# Patient Record
Sex: MALE | Race: WHITE | NOT HISPANIC OR LATINO | URBAN - METROPOLITAN AREA
[De-identification: names, ages, dates, MRNs, and addresses within clinical notes are randomized per-mention and may not be internally consistent; named-entity substitution may affect disease eponyms.]

---

## 2018-02-07 ENCOUNTER — EMERGENCY (EMERGENCY)
Facility: HOSPITAL | Age: 43
LOS: 1 days | Discharge: HOME | End: 2018-02-07
Admitting: EMERGENCY MEDICINE
Payer: COMMERCIAL

## 2018-02-07 VITALS
SYSTOLIC BLOOD PRESSURE: 123 MMHG | HEART RATE: 74 BPM | RESPIRATION RATE: 17 BRPM | OXYGEN SATURATION: 98 % | TEMPERATURE: 98 F | DIASTOLIC BLOOD PRESSURE: 73 MMHG

## 2018-02-07 DIAGNOSIS — F10.21 ALCOHOL DEPENDENCE, IN REMISSION: ICD-10-CM

## 2018-02-07 DIAGNOSIS — F20.9 SCHIZOPHRENIA, UNSPECIFIED: ICD-10-CM

## 2018-02-07 DIAGNOSIS — F43.23 ADJUSTMENT DISORDER WITH MIXED ANXIETY AND DEPRESSED MOOD: ICD-10-CM

## 2018-02-07 DIAGNOSIS — R69 ILLNESS, UNSPECIFIED: ICD-10-CM

## 2018-02-07 LAB
ALBUMIN SERPL ELPH-MCNC: 4.6 G/DL — SIGNIFICANT CHANGE UP (ref 3.3–5)
ALP SERPL-CCNC: 46 U/L — SIGNIFICANT CHANGE UP (ref 40–120)
ALT FLD-CCNC: 15 U/L — SIGNIFICANT CHANGE UP (ref 4–41)
AMPHET UR-MCNC: NEGATIVE — SIGNIFICANT CHANGE UP
APAP SERPL-MCNC: < 15 UG/ML — LOW (ref 15–25)
APPEARANCE UR: CLEAR — SIGNIFICANT CHANGE UP
AST SERPL-CCNC: 13 U/L — SIGNIFICANT CHANGE UP (ref 4–40)
BARBITURATES MEASUREMENT: NEGATIVE — SIGNIFICANT CHANGE UP
BARBITURATES UR SCN-MCNC: NEGATIVE — SIGNIFICANT CHANGE UP
BASOPHILS # BLD AUTO: 0.03 K/UL — SIGNIFICANT CHANGE UP (ref 0–0.2)
BASOPHILS NFR BLD AUTO: 0.5 % — SIGNIFICANT CHANGE UP (ref 0–2)
BENZODIAZ SERPL-MCNC: NEGATIVE — SIGNIFICANT CHANGE UP
BENZODIAZ UR-MCNC: NEGATIVE — SIGNIFICANT CHANGE UP
BILIRUB SERPL-MCNC: 0.3 MG/DL — SIGNIFICANT CHANGE UP (ref 0.2–1.2)
BILIRUB UR-MCNC: NEGATIVE — SIGNIFICANT CHANGE UP
BLOOD UR QL VISUAL: NEGATIVE — SIGNIFICANT CHANGE UP
BUN SERPL-MCNC: 10 MG/DL — SIGNIFICANT CHANGE UP (ref 7–23)
CALCIUM SERPL-MCNC: 9.4 MG/DL — SIGNIFICANT CHANGE UP (ref 8.4–10.5)
CANNABINOIDS UR-MCNC: NEGATIVE — SIGNIFICANT CHANGE UP
CHLORIDE SERPL-SCNC: 103 MMOL/L — SIGNIFICANT CHANGE UP (ref 98–107)
CO2 SERPL-SCNC: 28 MMOL/L — SIGNIFICANT CHANGE UP (ref 22–31)
COCAINE METAB.OTHER UR-MCNC: NEGATIVE — SIGNIFICANT CHANGE UP
COLOR SPEC: SIGNIFICANT CHANGE UP
CREAT SERPL-MCNC: 1.11 MG/DL — SIGNIFICANT CHANGE UP (ref 0.5–1.3)
EOSINOPHIL # BLD AUTO: 0.07 K/UL — SIGNIFICANT CHANGE UP (ref 0–0.5)
EOSINOPHIL NFR BLD AUTO: 1.1 % — SIGNIFICANT CHANGE UP (ref 0–6)
ETHANOL BLD-MCNC: < 10 MG/DL — SIGNIFICANT CHANGE UP
GLUCOSE SERPL-MCNC: 97 MG/DL — SIGNIFICANT CHANGE UP (ref 70–99)
GLUCOSE UR-MCNC: NEGATIVE — SIGNIFICANT CHANGE UP
HCT VFR BLD CALC: 41.2 % — SIGNIFICANT CHANGE UP (ref 39–50)
HGB BLD-MCNC: 13.9 G/DL — SIGNIFICANT CHANGE UP (ref 13–17)
IMM GRANULOCYTES # BLD AUTO: 0.01 # — SIGNIFICANT CHANGE UP
IMM GRANULOCYTES NFR BLD AUTO: 0.2 % — SIGNIFICANT CHANGE UP (ref 0–1.5)
KETONES UR-MCNC: NEGATIVE — SIGNIFICANT CHANGE UP
LEUKOCYTE ESTERASE UR-ACNC: NEGATIVE — SIGNIFICANT CHANGE UP
LYMPHOCYTES # BLD AUTO: 1.5 K/UL — SIGNIFICANT CHANGE UP (ref 1–3.3)
LYMPHOCYTES # BLD AUTO: 24.1 % — SIGNIFICANT CHANGE UP (ref 13–44)
MCHC RBC-ENTMCNC: 30.5 PG — SIGNIFICANT CHANGE UP (ref 27–34)
MCHC RBC-ENTMCNC: 33.7 % — SIGNIFICANT CHANGE UP (ref 32–36)
MCV RBC AUTO: 90.4 FL — SIGNIFICANT CHANGE UP (ref 80–100)
METHADONE UR-MCNC: NEGATIVE — SIGNIFICANT CHANGE UP
MONOCYTES # BLD AUTO: 0.48 K/UL — SIGNIFICANT CHANGE UP (ref 0–0.9)
MONOCYTES NFR BLD AUTO: 7.7 % — SIGNIFICANT CHANGE UP (ref 2–14)
NEUTROPHILS # BLD AUTO: 4.14 K/UL — SIGNIFICANT CHANGE UP (ref 1.8–7.4)
NEUTROPHILS NFR BLD AUTO: 66.4 % — SIGNIFICANT CHANGE UP (ref 43–77)
NITRITE UR-MCNC: NEGATIVE — SIGNIFICANT CHANGE UP
NRBC # FLD: 0 — SIGNIFICANT CHANGE UP
OPIATES UR-MCNC: NEGATIVE — SIGNIFICANT CHANGE UP
OXYCODONE UR-MCNC: NEGATIVE — SIGNIFICANT CHANGE UP
PCP UR-MCNC: NEGATIVE — SIGNIFICANT CHANGE UP
PH UR: 7 — SIGNIFICANT CHANGE UP (ref 4.6–8)
PLATELET # BLD AUTO: 250 K/UL — SIGNIFICANT CHANGE UP (ref 150–400)
PMV BLD: 9.1 FL — SIGNIFICANT CHANGE UP (ref 7–13)
POTASSIUM SERPL-MCNC: 4.8 MMOL/L — SIGNIFICANT CHANGE UP (ref 3.5–5.3)
POTASSIUM SERPL-SCNC: 4.8 MMOL/L — SIGNIFICANT CHANGE UP (ref 3.5–5.3)
PROT SERPL-MCNC: 7 G/DL — SIGNIFICANT CHANGE UP (ref 6–8.3)
PROT UR-MCNC: 10 MG/DL — SIGNIFICANT CHANGE UP
RBC # BLD: 4.56 M/UL — SIGNIFICANT CHANGE UP (ref 4.2–5.8)
RBC # FLD: 12 % — SIGNIFICANT CHANGE UP (ref 10.3–14.5)
RBC CASTS # UR COMP ASSIST: SIGNIFICANT CHANGE UP (ref 0–?)
SALICYLATES SERPL-MCNC: < 5 MG/DL — LOW (ref 15–30)
SODIUM SERPL-SCNC: 142 MMOL/L — SIGNIFICANT CHANGE UP (ref 135–145)
SP GR SPEC: 1.01 — SIGNIFICANT CHANGE UP (ref 1–1.04)
TSH SERPL-MCNC: 1.37 UIU/ML — SIGNIFICANT CHANGE UP (ref 0.27–4.2)
UROBILINOGEN FLD QL: NORMAL MG/DL — SIGNIFICANT CHANGE UP
WBC # BLD: 6.23 K/UL — SIGNIFICANT CHANGE UP (ref 3.8–10.5)
WBC # FLD AUTO: 6.23 K/UL — SIGNIFICANT CHANGE UP (ref 3.8–10.5)

## 2018-02-07 PROCEDURE — 90792 PSYCH DIAG EVAL W/MED SRVCS: CPT | Mod: GC

## 2018-02-07 PROCEDURE — 93010 ELECTROCARDIOGRAM REPORT: CPT | Mod: 76

## 2018-02-07 PROCEDURE — 99285 EMERGENCY DEPT VISIT HI MDM: CPT

## 2018-02-07 NOTE — ED PROVIDER NOTE - MEDICAL DECISION MAKING DETAILS
This is a 42 year old Male BIB family for psych eval r/t suicidal thoughts. Patient stats  increased anxiety and depression with suicidal thoughts for over a week. Patient states he does not have a plan but thinks about " how people can commit suicide" Medical evaluation performed. There is no clinical evidence of intoxication or any acute medical problem requiring immediate intervention. Final disposition will be determined by psychiatrist.

## 2018-02-07 NOTE — ED BEHAVIORAL HEALTH ASSESSMENT NOTE - DETAILS
no hx of suicide attempts. fleeting vague thoughts about suicide today but no plan or intent. resting tremor on perphenazine fatigue pt and family aware

## 2018-02-07 NOTE — ED PROVIDER NOTE - OBJECTIVE STATEMENT
This is a 42 year old Male BIB family for psych eval r/t suicidal thoughts. Patient stats  increased anxiety and depression with suicidal thoughts for over a week. Patient states he does not have a plan but thinks about " how people can commit suicide" Denies HI Denies AH/VH Denies ETOH/Illicit drugs Reports compliant with medications + Smoker Reports compliant with medication.

## 2018-02-07 NOTE — ED ADULT TRIAGE NOTE - CHIEF COMPLAINT QUOTE
pt brought in my father for increased anxiety, tremors x 2 months and being withdrawn. pt states " I feel very depressed" denies SI but states " I have been having a lot of negative thought" denies si/hi/ah/vh currently.  spoke with  NP Elidia, pt to go directly back

## 2018-02-07 NOTE — ED BEHAVIORAL HEALTH ASSESSMENT NOTE - SUICIDE PROTECTIVE FACTORS
Responsibility to family and others/Identifies reasons for living/Supportive social network or family/Engaged in work or school/Future oriented/Positive therapeutic relationships

## 2018-02-07 NOTE — ED BEHAVIORAL HEALTH ASSESSMENT NOTE - SUMMARY
Patient is a 43 y/o  M with charted PPhx of schizophrenia, ETOH dependence in remission, multiple hospitalizations last in 2015, no hx of suicide attempts, no violence hx, pt denies legal problems however reports having "internal charges at work", no current substance abuse, PMhx of HLD and herniated disc. Patient presents brought in by father self referred for anxious and depressed mood in the context of work stressors and transient suicidal ideation that is now resolved. He denies current SI/HI/I/P, urges for SIB, or aggressive I/I/P. He denies all manic and psychotic symptoms. He is able to engage in safety planning and is agreeable to return to the ED if he experiences recurrence of symptoms or any other safety concerns. Appt scheduled with outpatient psychiatrist Dr. Zaldivar for tomorrow at 3pm.

## 2018-02-07 NOTE — ED BEHAVIORAL HEALTH ASSESSMENT NOTE - RISK ASSESSMENT
Risk factors include Risk factors include chronic mental illness, acute stressors, and recent changes in treatment. Protective factors include no current SI/HI/I/P, future orientation, no hx of suicide attempts, positive outpatient therapeutic relationships, no access to guns, supportive father, housing and employment stability, and able to engage in safety planning. At this time pt is at low imminent risk but at elevated chronic risk. Plan to mitigate risk with continued outpatient treatment.

## 2018-02-07 NOTE — ED BEHAVIORAL HEALTH ASSESSMENT NOTE - CASE SUMMARY
Patient is a 41 y/o  M with charted PPhx of schizophrenia, ETOH dependence in remission, multiple hospitalizations last in 2015, no hx of suicide attempts, no violence hx, pt denies legal problems however reports having "internal charges at work", no current substance abuse, PMhx of HLD and herniated disc. Patient presents brought in by father self referred for anxious and depressed mood in the context of work stressors and transient suicidal ideation that is now resolved. He denies current SI/HI/I/P, urges for SIB, or aggressive I/I/P. He denies all manic and psychotic symptoms. He is able to engage in safety planning and is agreeable to return to the ED if he experiences recurrence of symptoms or any other safety concerns.

## 2018-02-07 NOTE — ED BEHAVIORAL HEALTH ASSESSMENT NOTE - HPI (INCLUDE ILLNESS QUALITY, SEVERITY, DURATION, TIMING, CONTEXT, MODIFYING FACTORS, ASSOCIATED SIGNS AND SYMPTOMS)
Patient is a 43 y/o  M, single, noncaregiver, domiciled with father, employed as a , with charted PPhx of schizophrenia, ETOH dependence in remission, multiple hospitalizations last in 2015, no hx of suicide attempts, no violence hx, pt denies legal problems however reports having "internal charges at work", no current substance abuse, PMhx of HLD and herniated disc. Patient presents brought in by father self referred for depressed mood and transient suicidal ideation.    Patient states that for the last few days he has been experiencing anxious and depressed mood, hypersomnia, and low energy level in the context of stressors at work and new onset right sided resting tremor which is bothersome. He states that he is facing numerous internal charges at work for "complaining about the uniforms" and "tripping a woman" out of frustration. He states that he is worried he may lose his job and had to speak with a company  today, who recommended he see a psychiatrist. Patient states that he came into the ED today for this reason. He reports today having fleeting thoughts of suicide "not for myself" but rather wondering "how do people commit suicide". He denies having intent or plan for himself. He denies HI/I/P, urges for SIB, or aggressive I/I/P. He denies all manic and psychotic symptoms. He denies substance use besides alcohol "1-2 glasses of wine" twice per week.     See  note for collateral from pt's father.    Attempt made to reach pt's psychiatrist Dr. Duong but he is out of the office. Spoke with covering psychiatrist Dr. Zaldivar who states that pt called him yesterday for similar complaint. Per telephone contact note: "Patient left unidentified VM on cross-covering MD's voicemail requesting callback at [phone]. On callback, patient states: "medicine is not working so well... the perphenazine and the wellbutryn". States he has been a little more depressed for a few days. Denies AH/VH/paranoia/referentiality. Denies suicidality or homicidality. States this is a mild change from his baseline. States he has an appointment with Dr. Duong on 2/13/18 and that this can wait till then, advised him to discuss concerns with his primary MD at his appointment." Dr. Zaldivar states that he can see patient tomorrow at 3pm for an emergent appt.

## 2018-02-07 NOTE — ED BEHAVIORAL HEALTH ASSESSMENT NOTE - DESCRIPTION
calm and cooperative    Vital Signs Last 24 Hrs  T(C): 36.8 (07 Feb 2018 13:40), Max: 36.8 (07 Feb 2018 13:40)  T(F): 98.2 (07 Feb 2018 13:40), Max: 98.2 (07 Feb 2018 13:40)  HR: 74 (07 Feb 2018 13:40) (74 - 74)  BP: 123/73 (07 Feb 2018 13:40) (123/73 - 123/73)  BP(mean): --  RR: 17 (07 Feb 2018 13:40) (17 - 17)  SpO2: 98% (07 Feb 2018 13:40) (98% - 98%) HLD and herniated disc. NKDA see HPI

## 2018-02-07 NOTE — ED BEHAVIORAL HEALTH NOTE - BEHAVIORAL HEALTH NOTE
Worker spoke to patient’s father Juanpablo Wallace ([phone]/ cell [phone]) for collateral information. All information is as per Mr. Wallace:    Patient is a 42 year old male, domiciled with father, with a past history of alcohol abuse, and BIB by father as a walk in. Mr. Wallace reports that patient has a long standing history of emotional problems and he has been an admitted at [facility] multiple times. Mr. Wallace reports that the patient has had a recent change in medications 3-4 months ago. Since the medication alteration the patient has been withdrawn, lacking of concentration, isolated, and has not been himself. Mr. Wallace reports that the patient's behavior has changed where the patient does not go to the gym, [employer]al or AA meetings anymore. Mr. Wallace reports that 15 months before that the patient refused to take any medication. The patient’s father states that for the 15 months he was not talking to him and did not see his sisters.  Mr. Wallace also states that for the 15 months he was fearful of the patient because he was angry and he was not like himself. Mr. Wallace reports patient was not physically aggressive. Mr. Wallace reports that the patient was calling the building department on him and called the police. Mr Wallace states that when he was non-compliant for the 15 months he called the police and stated that he was attacked by him. Mr. Wallace reports that only recently he restarted to talk to him again. Mr. Wallace states that the patient had to go to court today but his  stated he needed to go to the hospital because his speech was slow, his hands were shaking, he was withdrawn, and he was avoiding eye contact.  Mr. Wallace reports that his court date was postponed for today, according to his . Mr. Wallace states that he is not aware of his legal issues because the patient never spoke to him about it. Mr. Wallace reports that a couple days ago the patient had shown him a letter from a collection agency for $5,500 and asked him what should do. Mr. Wallace reports that patient works for the [employer] department and has said that he was frustrated at work. Mr. Wallace states he is unaware of patient’s diagnosis. Mr. Wallace states that patient is not on any drugs or alcohol but in the past he had a drinking problem. Mr. Wallace reports that there is no family history of psychiatric issues and that patient does not have SI, SIB, HI, AH, VH, or delusions. Mr. Wallace states that he is concerned because patient seems like he is not happy with himself and he has no social life or friends. In the past, the patient lived in his own apartment in [place] and he destroyed the apartment and threw objects out of the window, resulting in an admission in [facility]. Mr. Wallace reports that patient is taking Hydroxyzine 25mg twice a day, Zantax (unknown dosage), and Prehenazine 8mg 1 tablet a day. Mr. Wallace is unsure if the patient is on any additional medication.  Mr. Wallace states that patient has no medical problems and there is no guns or weapons in the home. Mr. Wallace reports that the patient sees a psychiatrist Dr. Killian ([phone]) at [facility] and a psychotherapist Kenzie River ([phone]) once a week. Mr. Wallace reports that patient does not SI but his mother seems to think so. Mr. Wallace also states he becomes paranoid at times because he bought a safe for his room and he thinks his father uses his wash cloth.

## 2018-03-13 NOTE — ED BEHAVIORAL HEALTH ASSESSMENT NOTE - NS ED [facility] OTHER STREET DRUGS MEDICATION
None known Home Suture Removal Text: Patient was provided a home suture removal kit and will remove their sutures at home.  If they have any questions or difficulties they will call the office.

## 2020-03-18 ENCOUNTER — APPOINTMENT (OUTPATIENT)
Dept: UROLOGY | Facility: CLINIC | Age: 45
End: 2020-03-18

## 2020-04-14 ENCOUNTER — APPOINTMENT (OUTPATIENT)
Dept: UROLOGY | Facility: CLINIC | Age: 45
End: 2020-04-14

## 2021-11-01 ENCOUNTER — NON-APPOINTMENT (OUTPATIENT)
Age: 46
End: 2021-11-01

## 2021-11-14 ENCOUNTER — TRANSCRIPTION ENCOUNTER (OUTPATIENT)
Age: 46
End: 2021-11-14

## 2021-11-24 ENCOUNTER — TRANSCRIPTION ENCOUNTER (OUTPATIENT)
Age: 46
End: 2021-11-24

## 2021-11-24 ENCOUNTER — APPOINTMENT (OUTPATIENT)
Dept: OTOLARYNGOLOGY | Facility: CLINIC | Age: 46
End: 2021-11-24
Payer: MEDICARE

## 2021-11-24 ENCOUNTER — NON-APPOINTMENT (OUTPATIENT)
Age: 46
End: 2021-11-24

## 2021-11-24 VITALS
BODY MASS INDEX: 27.77 KG/M2 | TEMPERATURE: 98 F | HEART RATE: 90 BPM | HEIGHT: 72 IN | DIASTOLIC BLOOD PRESSURE: 89 MMHG | WEIGHT: 205 LBS | SYSTOLIC BLOOD PRESSURE: 129 MMHG

## 2021-11-24 PROCEDURE — 31575 DIAGNOSTIC LARYNGOSCOPY: CPT

## 2021-11-24 PROCEDURE — 99204 OFFICE O/P NEW MOD 45 MIN: CPT | Mod: 25

## 2021-11-24 PROCEDURE — 69210 REMOVE IMPACTED EAR WAX UNI: CPT

## 2021-11-24 RX ORDER — FLUTICASONE PROPIONATE 50 MCG
50 SPRAY, SUSPENSION NASAL
Refills: 0 | Status: ACTIVE | COMMUNITY

## 2021-11-24 RX ORDER — FEXOFENADINE HYDROCHLORIDE AND PSEUDOEPHEDRINE HYDROCHLORIDE 180; 240 MG/1; MG/1
TABLET, FILM COATED, EXTENDED RELEASE ORAL
Refills: 0 | Status: ACTIVE | COMMUNITY

## 2021-11-24 RX ORDER — ARIPIPRAZOLE 5 MG/1
5 TABLET ORAL
Refills: 0 | Status: ACTIVE | COMMUNITY

## 2021-11-24 RX ORDER — ATORVASTATIN CALCIUM 80 MG/1
TABLET, FILM COATED ORAL
Refills: 0 | Status: ACTIVE | COMMUNITY

## 2021-11-24 NOTE — ASSESSMENT
[FreeTextEntry1] : 44 y/o M who presents with b/l otalgia, on exam b/l CI which was cleared, otherwise normal exam. \par left ear cleaned, unable to tolerate on the left \par avoid q tips \par ear hygiene\par discussed preventive measures and signs of accumulation\par R ear still with some hard wax, advised to use few drops of mineral oil, can f/u in 1-2 weeks to remove R ear cerumen\par \par \par Pt also with throat discomfort, on exam mild acid reflux seen \par will proceed to start lifestyle regiment to reduce overproduction of acid and reduce laryngeal reflux including avoiding caffein, alcohol, eating before bed, spicy and fatty foods, and head elevation at night etc. Handout detailing regiment also given\par started on PPI x 2 weeks \par \par f/up 2 weeks

## 2021-11-24 NOTE — HISTORY OF PRESENT ILLNESS
[de-identified] : 44 y/o M c/o intermittent sharp ear pain b/l, with some sensation on top his head. Did tele doc and was given amox with relief, no longer with any pain. \par no changes in hearing\par no Vertigo, tinnitus, pain, drainage or facial weakness.\par + dysphonia/hoarseness since then\par Denies dysphagia, dyspnea \par had recent EGD reportedly with no abnormalities \par \par Also with intermittent throat discomfort, contacted PMD who stated can use Allegra D, and Flonase. Pt states with mild occ nasal congestion b/l\par +dysphonia \par quit smoking 11 years ago, smoked for 17 years. \par pt denies dysphagia, nasal congestion

## 2021-11-24 NOTE — END OF VISIT
[FreeTextEntry3] : I personally saw and examined KENN JAI in detail.  I spoke to RAHUL Brooks regarding the assessment and plan of care. I performed the procedures and relevant physical exam.  I have reviewed the above assessment and plan of care and I agree.  I have made changes to the body of the note wherever necessary and appropriate.

## 2021-11-24 NOTE — PROCEDURE
[FreeTextEntry3] : After informed verbal consent is obtained, cerumen is removed from the left ear canal with a curette & suction. Pt tolerated well, no residual ear canal irritation. \par  unable to tolerate on the right even with peroxide soak. \par  [de-identified] : Procedure performed: laryngeal Endoscopy- Diagnostic\par Pre-op/post op indication: dysphonia\par Verbal and/or written consent obtained from patient, Patient was unable to cooperate with mirror\par Scope #: 3, flexible fiber optic telescope used \par Scope was introduced through the nose passed on the floor of the nose to the nasopharynx and then followed down the soft palate to the lower pharynx. The tongue Base, Larynx, Hypopharynx were examined. Base of tongue was symmetric, vallecular was clear, epiglottis was not deformed, subglottis/ pyriform and posterior pharyngeal walls were clear. +  mild acid reflux , +erythema, edema, pooling of secretions, masses or lesions. Airway patent, no foreign body visualized. No glottic/supraglottic edema. True vocal cords, arytenoids, vestibular folds, ventricles, pyriform sinuses, and aryepiglottic folds appear normal bilaterally. Vocal cords mobile with good contact b/l.\par \par

## 2021-11-24 NOTE — PHYSICAL EXAM
[Midline] : trachea located in midline position [de-identified] : b/l CI  [Normal] : no abnormal secretions [Laryngoscopy Performed] : laryngoscopy was performed, see procedure section for findings

## 2021-11-29 ENCOUNTER — NON-APPOINTMENT (OUTPATIENT)
Age: 46
End: 2021-11-29

## 2021-12-07 ENCOUNTER — APPOINTMENT (OUTPATIENT)
Dept: OTOLARYNGOLOGY | Facility: CLINIC | Age: 46
End: 2021-12-07
Payer: MEDICARE

## 2021-12-07 VITALS
TEMPERATURE: 97.2 F | SYSTOLIC BLOOD PRESSURE: 139 MMHG | WEIGHT: 205 LBS | DIASTOLIC BLOOD PRESSURE: 85 MMHG | HEART RATE: 74 BPM | BODY MASS INDEX: 27.77 KG/M2 | HEIGHT: 72 IN

## 2021-12-07 PROCEDURE — 69210 REMOVE IMPACTED EAR WAX UNI: CPT

## 2021-12-07 PROCEDURE — 99213 OFFICE O/P EST LOW 20 MIN: CPT | Mod: 25

## 2021-12-07 NOTE — END OF VISIT
[FreeTextEntry3] : I personally saw and examined KENN JAI in detail.  I spoke to VENICE Garcia regarding the assessment and plan of care. I performed the procedures and relevant physical exam.  I have reviewed the above assessment and plan of care and I agree.  I have made changes to the body of the note wherever necessary and appropriate.

## 2021-12-07 NOTE — HISTORY OF PRESENT ILLNESS
[de-identified] : 44 y/o M c/o intermittent sharp ear pain b/l, with some sensation on top his head. Did tele doc and was given amox with relief, no longer with any pain. \par no changes in hearing\par no Vertigo, tinnitus, pain, drainage or facial weakness.\par + dysphonia/hoarseness since then\par Denies dysphagia, dyspnea \par had recent EGD (April)  reportedly with no abnormalities \par \par Also with intermittent throat discomfort, contacted PMD who stated can use Allegra D, and Flonase. Pt states with mild occ nasal congestion b/l\par +dysphonia \par quit smoking 11 years ago, smoked for 17 years. \par pt denies dysphagia, nasal congestion \par at last visit, was given nexium, taking for the last 2 weeks.  Also using mineral oil for ear.  [FreeTextEntry1] : Patient presents for ear cleaning he has been using Mineral oil daily. States reflux is controlled with esomeprazole, he has been doing well for 2 days. Complains that he makes a lot of saliva and he has some swelling without pain on the left side of his neck.

## 2021-12-07 NOTE — PHYSICAL EXAM
[Normal] : mucosa is normal [Midline] : trachea located in midline position [Laryngoscopy Performed] : laryngoscopy was performed, see procedure section for findings [de-identified] : b/l CI

## 2021-12-07 NOTE — ASSESSMENT
[FreeTextEntry1] : Patient with hx of reflux presets for ear cleaning, last visit he had bilateral cerumen impaction. He has been using mineral oil daily. He is taking esomeprazole daily has been feeling well for the past 2 days. He also notes that he makes a lot of saliva. Today he complains of left sided neck swelling. On examination both ears were impacted with cerumen. Also with has mild left sided salivary gland swelling.\par -Wax cleaned from both ears with suction \par -Continue Esomeprazole x 1 month \par \par f/u 2 month or prn \par

## 2021-12-07 NOTE — PROCEDURE
[FreeTextEntry3] : After informed verbal consent is obtained, cerumen is removed from the b/l ear canal with a  suction. Pt tolerated well, no residual ear canal irritation. \par  \par  [de-identified] : Procedure performed: laryngeal Endoscopy- Diagnostic\par Pre-op/post op indication: dysphonia\par Verbal and/or written consent obtained from patient, Patient was unable to cooperate with mirror\par Scope #: 3, flexible fiber optic telescope used \par Scope was introduced through the nose passed on the floor of the nose to the nasopharynx and then followed down the soft palate to the lower pharynx. The tongue Base, Larynx, Hypopharynx were examined. Base of tongue was symmetric, vallecular was clear, epiglottis was not deformed, subglottis/ pyriform and posterior pharyngeal walls were clear. +  mild acid reflux , +erythema, edema, pooling of secretions, masses or lesions. Airway patent, no foreign body visualized. No glottic/supraglottic edema. True vocal cords, arytenoids, vestibular folds, ventricles, pyriform sinuses, and aryepiglottic folds appear normal bilaterally. Vocal cords mobile with good contact b/l.\par \par

## 2021-12-27 ENCOUNTER — NON-APPOINTMENT (OUTPATIENT)
Age: 46
End: 2021-12-27

## 2022-02-01 ENCOUNTER — APPOINTMENT (OUTPATIENT)
Dept: OTOLARYNGOLOGY | Facility: CLINIC | Age: 47
End: 2022-02-01
Payer: MEDICARE

## 2022-02-01 VITALS
TEMPERATURE: 97.8 F | SYSTOLIC BLOOD PRESSURE: 110 MMHG | HEIGHT: 72 IN | HEART RATE: 70 BPM | DIASTOLIC BLOOD PRESSURE: 72 MMHG | WEIGHT: 210 LBS | BODY MASS INDEX: 28.44 KG/M2

## 2022-02-01 PROCEDURE — 99213 OFFICE O/P EST LOW 20 MIN: CPT | Mod: 25

## 2022-02-01 PROCEDURE — 69210 REMOVE IMPACTED EAR WAX UNI: CPT

## 2022-02-01 NOTE — HISTORY OF PRESENT ILLNESS
[de-identified] : Patient with hx reflux and cerumen impaction presents for follow up. He finished taking omeprazole, reflux has resolved. He continues to have left salivary gland swelling. He also has increased saliva. \par He did switch his medications recently to latuda.

## 2022-02-01 NOTE — PHYSICAL EXAM
[de-identified] : left ear wax  [Normal] : mucosa is normal [Midline] : trachea located in midline position

## 2022-02-01 NOTE — ASSESSMENT
[FreeTextEntry1] :  presents today complaining of left salivary gland swelling. States there is no improvement since last visit. He also notes that he changed his medication to latuda and has increase saliva since. On exam he has left cerumen impaction. Salivary gland is wnl, looks slightly sluggish might be due to dehydration, no masses palpated. \par \par Plan:\par -Left ear cerumen cleaned, use debrox weekly to keep ears clean, routine debridement advised \par -reflux resolved, continue with reflux precautions \par -no signs of salivary obstruction, increase hydration \par -f/u 3 months or prn \par

## 2022-05-03 ENCOUNTER — APPOINTMENT (OUTPATIENT)
Dept: OTOLARYNGOLOGY | Facility: CLINIC | Age: 47
End: 2022-05-03
Payer: MEDICARE

## 2022-05-03 VITALS
DIASTOLIC BLOOD PRESSURE: 78 MMHG | HEART RATE: 76 BPM | SYSTOLIC BLOOD PRESSURE: 130 MMHG | TEMPERATURE: 98 F | WEIGHT: 210 LBS | BODY MASS INDEX: 28.44 KG/M2 | HEIGHT: 72 IN

## 2022-05-03 DIAGNOSIS — H92.03 OTALGIA, BILATERAL: ICD-10-CM

## 2022-05-03 PROCEDURE — 99213 OFFICE O/P EST LOW 20 MIN: CPT | Mod: 25

## 2022-05-03 PROCEDURE — 69210 REMOVE IMPACTED EAR WAX UNI: CPT

## 2022-05-03 NOTE — HISTORY OF PRESENT ILLNESS
[de-identified] : Patient with hx reflux and cerumen impaction presents for follow up. He finished taking omeprazole, reflux has resolved. He continues to have left salivary gland swelling. He also has increased saliva. \par He did switch his medications recently to latuda.  [FreeTextEntry1] : pt notices his left salivary gland is slightly swollen but less than last time, pt had right sided otalgia a month ago which resolved on its own. pt uses debrox weekly. no changes in hearing, not taking any allergy meds at this point

## 2022-05-03 NOTE — ASSESSMENT
[FreeTextEntry1] :  presents today complaining of left salivary gland swelling slightly better than last visit, has increased his water intake to a gallon a day. On exam found to have small amount of cerumen b/l which was removed  and  good salivary flow today.\par \par Plan:\par -continue to use debrox weekly to keep ears clean, routine debridement advised \par -reflux resolved, continue with reflux precautions \par -no signs of salivary obstruction today,  continue with increased hydration \par -f/u 3 months or prn \par

## 2022-05-03 NOTE — END OF VISIT
[FreeTextEntry3] : I personally saw and examined KENN ARONALBERTO in detail.  I spoke to CHAGO Gonzalez regarding the assessment and plan of care. I performed the procedures and relevant physical exam.  I have reviewed the above assessment and plan of care and I agree.  I have made changes to the body of the note wherever necessary and appropriate.\par

## 2022-05-03 NOTE — PROCEDURE
[FreeTextEntry2] : cerumen impaction\par  [FreeTextEntry3] : After informed verbal consent is obtained, cerumen is removed from the b/l ear canal with a suction. Pt tolerated well, no residual ear canal irritation. \par \par

## 2022-07-26 ENCOUNTER — APPOINTMENT (OUTPATIENT)
Dept: OTOLARYNGOLOGY | Facility: CLINIC | Age: 47
End: 2022-07-26

## 2023-06-14 ENCOUNTER — EMERGENCY (EMERGENCY)
Facility: HOSPITAL | Age: 48
LOS: 1 days | Discharge: HOME | End: 2023-06-14
Attending: EMERGENCY MEDICINE
Payer: MEDICARE

## 2023-06-14 VITALS
OXYGEN SATURATION: 94 % | TEMPERATURE: 99 F | HEIGHT: 72 IN | SYSTOLIC BLOOD PRESSURE: 145 MMHG | RESPIRATION RATE: 16 BRPM | WEIGHT: 210.1 LBS | HEART RATE: 101 BPM | DIASTOLIC BLOOD PRESSURE: 88 MMHG

## 2023-06-14 VITALS
RESPIRATION RATE: 16 BRPM | SYSTOLIC BLOOD PRESSURE: 125 MMHG | HEART RATE: 80 BPM | TEMPERATURE: 98 F | DIASTOLIC BLOOD PRESSURE: 81 MMHG | OXYGEN SATURATION: 96 %

## 2023-06-14 LAB
ALBUMIN SERPL ELPH-MCNC: 4.6 G/DL — SIGNIFICANT CHANGE UP (ref 3.3–5)
ALP SERPL-CCNC: 61 U/L — SIGNIFICANT CHANGE UP (ref 40–120)
ALT FLD-CCNC: 12 U/L — SIGNIFICANT CHANGE UP (ref 10–45)
ANION GAP SERPL CALC-SCNC: 11 MMOL/L — SIGNIFICANT CHANGE UP (ref 5–17)
AST SERPL-CCNC: 16 U/L — SIGNIFICANT CHANGE UP (ref 10–40)
BASOPHILS # BLD AUTO: 0.04 K/UL — SIGNIFICANT CHANGE UP (ref 0–0.2)
BASOPHILS NFR BLD AUTO: 0.5 % — SIGNIFICANT CHANGE UP (ref 0–2)
BILIRUB SERPL-MCNC: 0.4 MG/DL — SIGNIFICANT CHANGE UP (ref 0.2–1.2)
BUN SERPL-MCNC: 8 MG/DL — SIGNIFICANT CHANGE UP (ref 7–23)
CALCIUM SERPL-MCNC: 9.4 MG/DL — SIGNIFICANT CHANGE UP (ref 8.4–10.5)
CHLORIDE SERPL-SCNC: 102 MMOL/L — SIGNIFICANT CHANGE UP (ref 96–108)
CO2 SERPL-SCNC: 24 MMOL/L — SIGNIFICANT CHANGE UP (ref 22–31)
CREAT SERPL-MCNC: 1.08 MG/DL — SIGNIFICANT CHANGE UP (ref 0.5–1.3)
EGFR: 85 ML/MIN/1.73M2 — SIGNIFICANT CHANGE UP
EOSINOPHIL # BLD AUTO: 0.1 K/UL — SIGNIFICANT CHANGE UP (ref 0–0.5)
EOSINOPHIL NFR BLD AUTO: 1.2 % — SIGNIFICANT CHANGE UP (ref 0–6)
GLUCOSE SERPL-MCNC: 112 MG/DL — HIGH (ref 70–99)
HCT VFR BLD CALC: 45.3 % — SIGNIFICANT CHANGE UP (ref 39–50)
HGB BLD-MCNC: 15 G/DL — SIGNIFICANT CHANGE UP (ref 13–17)
IMM GRANULOCYTES NFR BLD AUTO: 0.2 % — SIGNIFICANT CHANGE UP (ref 0–0.9)
LYMPHOCYTES # BLD AUTO: 1.23 K/UL — SIGNIFICANT CHANGE UP (ref 1–3.3)
LYMPHOCYTES # BLD AUTO: 14.9 % — SIGNIFICANT CHANGE UP (ref 13–44)
MCHC RBC-ENTMCNC: 30.3 PG — SIGNIFICANT CHANGE UP (ref 27–34)
MCHC RBC-ENTMCNC: 33.1 GM/DL — SIGNIFICANT CHANGE UP (ref 32–36)
MCV RBC AUTO: 91.5 FL — SIGNIFICANT CHANGE UP (ref 80–100)
MONOCYTES # BLD AUTO: 0.54 K/UL — SIGNIFICANT CHANGE UP (ref 0–0.9)
MONOCYTES NFR BLD AUTO: 6.5 % — SIGNIFICANT CHANGE UP (ref 2–14)
NEUTROPHILS # BLD AUTO: 6.32 K/UL — SIGNIFICANT CHANGE UP (ref 1.8–7.4)
NEUTROPHILS NFR BLD AUTO: 76.7 % — SIGNIFICANT CHANGE UP (ref 43–77)
NRBC # BLD: 0 /100 WBCS — SIGNIFICANT CHANGE UP (ref 0–0)
PLATELET # BLD AUTO: 283 K/UL — SIGNIFICANT CHANGE UP (ref 150–400)
POTASSIUM SERPL-MCNC: 4 MMOL/L — SIGNIFICANT CHANGE UP (ref 3.5–5.3)
POTASSIUM SERPL-SCNC: 4 MMOL/L — SIGNIFICANT CHANGE UP (ref 3.5–5.3)
PROT SERPL-MCNC: 7.3 G/DL — SIGNIFICANT CHANGE UP (ref 6–8.3)
RBC # BLD: 4.95 M/UL — SIGNIFICANT CHANGE UP (ref 4.2–5.8)
RBC # FLD: 12 % — SIGNIFICANT CHANGE UP (ref 10.3–14.5)
SODIUM SERPL-SCNC: 137 MMOL/L — SIGNIFICANT CHANGE UP (ref 135–145)
WBC # BLD: 8.25 K/UL — SIGNIFICANT CHANGE UP (ref 3.8–10.5)
WBC # FLD AUTO: 8.25 K/UL — SIGNIFICANT CHANGE UP (ref 3.8–10.5)

## 2023-06-14 PROCEDURE — 99283 EMERGENCY DEPT VISIT LOW MDM: CPT | Mod: FS

## 2023-06-14 PROCEDURE — 80053 COMPREHEN METABOLIC PANEL: CPT

## 2023-06-14 PROCEDURE — 99283 EMERGENCY DEPT VISIT LOW MDM: CPT

## 2023-06-14 PROCEDURE — 85025 COMPLETE CBC W/AUTO DIFF WBC: CPT

## 2023-06-14 RX ORDER — ACETAMINOPHEN 500 MG
975 TABLET ORAL ONCE
Refills: 0 | Status: COMPLETED | OUTPATIENT
Start: 2023-06-14 | End: 2023-06-14

## 2023-06-14 RX ADMIN — Medication 975 MILLIGRAM(S): at 21:45

## 2023-06-14 NOTE — ED PROVIDER NOTE - NSFOLLOWUPINSTRUCTIONS_ED_ALL_ED_FT
Continue your current medication regimen.    Take Ibuprofen (i.e. Motrin, Advil) 600mg every 8 hrs for pain as needed. Take with food.   May alternate with Acetaminophen (Tylenol) 650mg every 6 hours for pain as needed.     Follow up with your Endocrinologist and Primary Care Provider upon discharge.     You may follow up with [facility]: call [phone] to make appointment.   Bring a copy of your test results (attached along with your discharge paperwork) with you to your appointment for further discussion, evaluation, and comparison with your prior results.    Please return to the Emergency Department immediately for any new, worsening, or concerning symptoms.

## 2023-06-14 NOTE — ED PROVIDER NOTE - PATIENT PORTAL LINK FT
You can access the FollowMyHealth Patient Portal offered by [facility] by registering at the following website: [email or web address]. By joining [facility]’s FollowMyHealth portal, you will also be able to view your health information using other applications (apps) compatible with our system.

## 2023-06-14 NOTE — ED PROVIDER NOTE - MUSCULOSKELETAL, MLM
Spine appears normal, range of motion is not limited, no muscle or joint tenderness. + b/l feet slightly cool, No ankle or pedal edema, No bony ttp of ankles or feet and Full ROM of ankle and toes intact, b/l PT pulses +, sensation intact

## 2023-06-14 NOTE — ED PROVIDER NOTE - PROGRESS NOTE DETAILS
Attending MD Jacques: Labs nonactionable, stable for discharge.  Follow up instructions given, importance of follow up emphasized, return to ED parameters reviewed and patient verbalized understanding.  All questions answered, all concerns addressed.

## 2023-06-14 NOTE — ED PROVIDER NOTE - CONSTITUTIONAL NEGATIVE STATEMENT, MLM
Pts wife called back, she stated that Henny Gomez was scheduled for Heart surgery today and they canceled it because He isn't feeling well. Loss of appetite, Running a fever, chills, nauseous. She stated that he had stop taking his  metFORMIN (GLUCOPHAGE) 1000 MG tablet    glipiZIDE (GLUCOTROL) 10 MG tablet    Because of surgery that was scheduled for today. But he will start taking it again.     Henny Gomez did go to a [place] testing site 0/9/01/20   Please call [phone] Prabhakar Parker no fever and no chills.

## 2023-06-14 NOTE — ED PROVIDER NOTE - OBJECTIVE STATEMENT
48yo M with PMH of HLD, depression, recently in process of w/u for Cushing's presenting with feeling that all of his toes are longer than usual, and his b/l lateral ankle bones are also larger than they previously were. Symptoms have been going on for several months. Reports he sometimes has b/l foot/ankle pain when he stands for a long time. Came to ED because he is concerned this is related to his possible Cushing's disease. Reports he went to his PCP 2 days ago for same complaint, was told everything is ok. Reports he still has another upcoming test with his endocrinologist. Reports negative pituitary MRI and normal adrenals on recent CT. Denies fever/chills, numbness/tingling, any injury to ankles or feet, any other joint discomfort.     PCP Dr. ALISON Renteria

## 2023-06-14 NOTE — ED PROVIDER NOTE - ATTENDING APP SHARED VISIT CONTRIBUTION OF CARE
Attending MD Jacques:   I personally have seen and examined this patient.  Physician assistant note reviewed and agree on plan of care and except where noted.  See below for details.     Seen in [facility]  PMD Dr. Lanza    47M with PMH/PSH including  HLD, depression, being worked up for Cushing's presents to the ED with "my toes are longer" and "my ankles are bigger".  Reports that her has noticed these changes over several months and notes that because of this he has pain when he stands for too long.  Reports went to see his PMD two days ago for similar complaint and was told that everything was ok.  Reports has been following with an endocrinologist and has an upcoming follow up.  Reports sometimes cramping in LEs.  Denies chest pain, shortness of breath, abdominal pain, nausea, vomiting, diarrhea, urinary complaints, fevers, chills, joint pain, trauma, fall.    Exam:   General: NAD  HENT: head NCAT, airway patent  Eyes: anicteric, no conjunctival injection   Lungs: lungs CTAB with good inspiratory effort, no wheezing, no rhonchi, no rales  Cardiac: +S1S2, no obvious m/r/g  GI: abdomen soft with +BS, NT, ND  MSK: ranging neck and extremities freely, bilateral LEs nontender from hip to toes, +2 DPs, FROM at hip, knees, ankles, feet  Neuro: moving all extremities spontaneously, nonfocal  Psych: normal mood and affect     A/P: 47M with reports that toes and ankles are bigger, clinical exam unremarkable, will obtain basic labs given reported cramping, if nonactionable, discussed will need outpatient follow up, verbalized understanding

## 2023-06-14 NOTE — ED ADULT NURSE NOTE - NSFALLUNIVINTERV_ED_ALL_ED
Bed/Stretcher in lowest position, wheels locked, appropriate side rails in place/Call bell, personal items and telephone in reach/Instruct patient to call for assistance before getting out of bed/chair/stretcher/Non-slip footwear applied when patient is off stretcher/[place] to call system/Physically safe environment - no spills, clutter or unnecessary equipment/Purposeful proactive rounding/Room/bathroom lighting operational, light cord in reach

## 2023-06-14 NOTE — ED ADULT NURSE NOTE - OBJECTIVE STATEMENT
48yo M w/ PMH HLD, depression, possibly Cushing's presents to ED c/o b/l ankle/foot pain. Pt states "I feel like my hoes re longer than usual and b/l ankle bones are also larger". Pt states that this has been ongoing for several months and he also has the discomfort when he stands for too long. Pt notes that he thinks this may be related to his possible Cushing's disease, he saw his PCP 2 days ago for the same complaint and was told everything was ok. Denies chest pain, SOB, N/V/D, weakness, dizziness, lightheadedness, numbness/tingling to extremities, trauma/injury, fevers/chills, sick contacts. A&Ox3. Strong peripheral pulses. Neurologically intact and follows commands. Pulse motor and sensation present and equal to all 4 extremities. Skin warm dry intact and normal for ethnicity. Ambulatory with steady gait in ED. Stretcher locked and in lowest position, appropriate side rails up. Pt instructed to notify RN if assistance is needed.

## 2023-07-24 ENCOUNTER — NON-APPOINTMENT (OUTPATIENT)
Age: 48
End: 2023-07-24

## 2023-07-24 ENCOUNTER — APPOINTMENT (OUTPATIENT)
Dept: PULMONOLOGY | Facility: CLINIC | Age: 48
End: 2023-07-24
Payer: MEDICARE

## 2023-07-24 VITALS
WEIGHT: 215 LBS | OXYGEN SATURATION: 98 % | DIASTOLIC BLOOD PRESSURE: 75 MMHG | HEART RATE: 75 BPM | HEIGHT: 72 IN | BODY MASS INDEX: 29.12 KG/M2 | SYSTOLIC BLOOD PRESSURE: 120 MMHG

## 2023-07-24 DIAGNOSIS — Z87.891 PERSONAL HISTORY OF NICOTINE DEPENDENCE: ICD-10-CM

## 2023-07-24 DIAGNOSIS — Z78.9 OTHER SPECIFIED HEALTH STATUS: ICD-10-CM

## 2023-07-24 DIAGNOSIS — Z82.5 FAMILY HISTORY OF ASTHMA AND OTHER CHRONIC LOWER RESPIRATORY DISEASES: ICD-10-CM

## 2023-07-24 DIAGNOSIS — Z80.1 FAMILY HISTORY OF MALIGNANT NEOPLASM OF TRACHEA, BRONCHUS AND LUNG: ICD-10-CM

## 2023-07-24 PROCEDURE — 94727 GAS DIL/WSHOT DETER LNG VOL: CPT

## 2023-07-24 PROCEDURE — 94060 EVALUATION OF WHEEZING: CPT

## 2023-07-24 PROCEDURE — 94729 DIFFUSING CAPACITY: CPT

## 2023-07-24 PROCEDURE — 99205 OFFICE O/P NEW HI 60 MIN: CPT | Mod: 25

## 2023-07-24 PROCEDURE — ZZZZZ: CPT

## 2023-07-24 PROCEDURE — 95012 NITRIC OXIDE EXP GAS DETER: CPT

## 2023-07-24 RX ORDER — ARIPIPRAZOLE 5 MG/1
5 TABLET ORAL
Qty: 30 | Refills: 0 | Status: DISCONTINUED | COMMUNITY
Start: 2022-12-07

## 2023-07-24 RX ORDER — BUPROPION HYDROCHLORIDE 300 MG/1
300 TABLET, EXTENDED RELEASE ORAL
Qty: 30 | Refills: 0 | Status: DISCONTINUED | COMMUNITY
Start: 2023-05-23

## 2023-07-24 RX ORDER — METRONIDAZOLE 7.5 MG/G
0.75 CREAM TOPICAL
Qty: 45 | Refills: 0 | Status: DISCONTINUED | COMMUNITY
Start: 2022-10-01

## 2023-07-24 RX ORDER — BUPROPION HYDROCHLORIDE 150 MG/1
150 TABLET, EXTENDED RELEASE ORAL
Qty: 30 | Refills: 0 | Status: DISCONTINUED | COMMUNITY
Start: 2023-04-04

## 2023-07-24 RX ORDER — ESOMEPRAZOLE MAGNESIUM 40 MG/1
40 CAPSULE, DELAYED RELEASE ORAL
Qty: 90 | Refills: 0 | Status: DISCONTINUED | COMMUNITY
Start: 2021-11-24 | End: 2023-07-24

## 2023-07-24 NOTE — DISCUSSION/SUMMARY
[FreeTextEntry1] : Pulmonary physiology normal\par Right upper lobe peripheral calcified pulmonary nodule 5 mm\par Favor pulmonary granuloma versus postinflammatory lesion\par Strong family history of lung cancer in both parents and maternal uncle\par \par Tobacco smoker greater than 20-year pack history was less than 15-year continuation of tobacco use\par Based on this review although radiology recommended to consider a 6-month follow-up based on Fleischner guidelines even for a high risk patient 1 year CT chest follow-up with me recommended with a low-dose screening protocol\par All questions answered at today's visit\par

## 2023-07-24 NOTE — REASON FOR VISIT
[Initial] : an initial visit [Abnormal CXR/ Chest CT] : an abnormal CXR/ chest CT [Asthma] : asthma [TextBox_44] : Pulmonary nodule

## 2023-07-24 NOTE — PHYSICAL EXAM
[No Acute Distress] : no acute distress [Normal Oropharynx] : normal oropharynx [I] : Mallampati Class: I [Normal Appearance] : normal appearance [Supple] : supple [No Neck Mass] : no neck mass [No JVD] : no jvd [Normal Rate/Rhythm] : normal rate/rhythm [Normal S1, S2] : normal s1, s2 [No Murmurs] : no murmurs [No Rubs] : no rubs [No Resp Distress] : no resp distress [No Acc Muscle Use] : no acc muscle use [Normal Palpation] : normal palpation [Normal Rhythm and Effort] : normal rhythm and effort [Clear to Auscultation Bilaterally] : clear to auscultation bilaterally [Normal to Percussion] : normal to percussion [No Abnormalities] : no abnormalities [Benign] : benign [Not Tender] : not tender [Soft] : soft [No HSM] : no hsm [Normal Bowel Sounds] : normal bowel sounds [Normal Gait] : normal gait [No Clubbing] : no clubbing [No Cyanosis] : no cyanosis [No Edema] : no edema [FROM] : FROM [Normal Color/ Pigmentation] : normal color/ pigmentation [No Focal Deficits] : no focal deficits [Oriented x3] : oriented x3 [Normal Affect] : normal affect

## 2023-07-24 NOTE — HISTORY OF PRESENT ILLNESS
[>= 20 pack years] : >= 20 pack years [Former] : former [TextBox_4] : 47-year-old patient for\par History of tobacco use\par Patient was undergoing work-up for adrenal pathology\par \par Underwent a CT scan of the chest\par Demonstrated a calcified peripheral pulmonary nodule collectibles can be postinflammatory versus granuloma\par Patient no prior imaging studies reported\par Reports a history of childhood asthma without any exacerbations flareups or asthma symptomatology routine negative for mental years\par No chronic cough wheeze chest pain chest tightness pleuritic chest pain purulent sputum\par Denies exertional or rest dyspnea\par \par History of COPD emphysema interstitial lung disease\par \par Smoker with a greater than 20-year pack history\par Discontinued tobacco  and patient has a strong family history of smoking and lung cancer\par Patient's father is living but has lung cancer ASHD cardiac bypass surgery and it was a extensive tobacco history\par Mother  lung cancer\par Patient has a maternal uncle  at the age of 55 with lung cancer\par \par Patient previously worked in construction\par Does not recall any toxic environmental exposures such as asbestosis\par \par  [YearQuit] : 2010 [TextBox_29] : d/cd age 34 1 1/2 + pack

## 2023-07-24 NOTE — CONSULT LETTER
[Dear  ___] : Dear  [unfilled], [Please see my note below.] : Please see my note below. [Consult Closing:] : Thank you very much for allowing me to participate in the care of this patient.  If you have any questions, please do not hesitate to contact me. [Sincerely,] : Sincerely, [FreeTextEntry3] : Benjamin Bowers D.O., JULIANNE\par  of Medicine\par [facility] [employer]\par

## 2023-07-24 NOTE — PROCEDURE
[FreeTextEntry1] : Chest CT scan July 14, 2023\par Former smoker\par Peripheral 5 mm calcified pulmonary nodule right upper lobe\par Impression\par Likely related to prior infection or granulomatous disease\par Radiology recommended to consider a 6-month CT chest follow-up\par \par PFT July 24, 2023\par Spirometry normal\par Lung volumes normal\par % predicted\par Diffusion 112% predicted normal range\par Hemoglobin 15.0\par \par NIOX  13  ppb WNL  7/24/23

## 2023-09-21 ENCOUNTER — APPOINTMENT (OUTPATIENT)
Dept: OTOLARYNGOLOGY | Facility: CLINIC | Age: 48
End: 2023-09-21

## 2023-12-13 ENCOUNTER — NON-APPOINTMENT (OUTPATIENT)
Age: 48
End: 2023-12-13

## 2024-01-05 ENCOUNTER — APPOINTMENT (OUTPATIENT)
Dept: PULMONOLOGY | Facility: CLINIC | Age: 49
End: 2024-01-05
Payer: MEDICARE

## 2024-01-05 VITALS — DIASTOLIC BLOOD PRESSURE: 73 MMHG | OXYGEN SATURATION: 97 % | HEART RATE: 86 BPM | SYSTOLIC BLOOD PRESSURE: 123 MMHG

## 2024-01-05 DIAGNOSIS — R91.1 SOLITARY PULMONARY NODULE: ICD-10-CM

## 2024-01-05 DIAGNOSIS — K21.9 GASTRO-ESOPHAGEAL REFLUX DISEASE W/OUT ESOPHAGITIS: ICD-10-CM

## 2024-01-05 DIAGNOSIS — Z87.891 PERSONAL HISTORY OF NICOTINE DEPENDENCE: ICD-10-CM

## 2024-01-05 PROCEDURE — 99214 OFFICE O/P EST MOD 30 MIN: CPT

## 2024-01-05 NOTE — HISTORY OF PRESENT ILLNESS
[Former] : former [>= 20 pack years] : >= 20 pack years [TextBox_4] : 47-year-old patient for\par  History of tobacco use\par  Patient was undergoing work-up for adrenal pathology\par  \par  Underwent a CT scan of the chest\par  Demonstrated a calcified peripheral pulmonary nodule collectibles can be postinflammatory versus granuloma\par  Patient no prior imaging studies reported\par  Reports a history of childhood asthma without any exacerbations flareups or asthma symptomatology routine negative for mental years\par  No chronic cough wheeze chest pain chest tightness pleuritic chest pain purulent sputum\par  Denies exertional or rest dyspnea\par  \par  History of COPD emphysema interstitial lung disease\par  \par  Smoker with a greater than 20-year pack history\par  Discontinued tobacco  and patient has a strong family history of smoking and lung cancer\par  Patient's father is living but has lung cancer ASHD cardiac bypass surgery and it was a extensive tobacco history\par  Mother  lung cancer\par  Patient has a maternal uncle  at the age of 55 with lung cancer\par  \par  Patient previously worked in construction\par  Does not recall any toxic environmental exposures such as asbestosis\par  \par   [YearQuit] : 2010 [TextBox_29] : d/cd age 34 1 1/2 + pack

## 2024-01-05 NOTE — PROCEDURE
[FreeTextEntry1] :     CT scan coronary artery study with CT scanning of the chest abdomen pelvis  No active pulmonary disease No evidence of coronary artery calcification Note abnormal mass lymph node enlargement abdominal fluid collection within abdomen pelvis Noted that they did not report a 5 mm previously noted peripheral calcified pulmonary nodule of the right upper lobe  Chest CT scan July 14, 2023 Former smoker Peripheral 5 mm calcified pulmonary nodule right upper lobe Impression Likely related to prior infection or granulomatous disease Radiology recommended to consider a 6-month CT chest follow-up  PFT July 24, 2023 Spirometry normal Lung volumes normal % predicted Diffusion 112% predicted normal range Hemoglobin 15.0  NIOX  13  ppb WNL  7/24/23

## 2024-01-05 NOTE — CONSULT LETTER
[Dear  ___] : Dear  [unfilled], [Please see my note below.] : Please see my note below. [Consult Closing:] : Thank you very much for allowing me to participate in the care of this patient.  If you have any questions, please do not hesitate to contact me. [Sincerely,] : Sincerely, [FreeTextEntry3] : Benjamin Bowers D.O., JULIANNE\par   of Medicine\par  [facility] [employer]\par

## 2024-01-05 NOTE — REASON FOR VISIT
[Follow-Up] : a follow-up visit [Abnormal CXR/ Chest CT] : an abnormal CXR/ chest CT [Asthma] : asthma [TextBox_44] : Pulmonary nodule

## 2024-01-05 NOTE — DISCUSSION/SUMMARY
[FreeTextEntry1] : Pulmonary physiology normal Right upper lobe peripheral calcified pulmonary nodule 5 mm Not seen on Dec 2023 CT CHEST CA body  scan Favor pulmonary granuloma versus postinflammatory lesion Strong family history of lung cancer in both parents and maternal uncle  Tobacco smoker greater than 20-year pack history was less than 15-year continuation of tobacco use Based on this review although radiology recommended to consider a 6-month follow-up based on Fleischner guidelines even for a high risk patient 1 year CT chest follow-up with me recommended with a low-dose screening protocol f/u CT CHEST Dec 2024 All questions answered at today's visit

## 2024-04-04 ENCOUNTER — APPOINTMENT (OUTPATIENT)
Dept: OTOLARYNGOLOGY | Facility: CLINIC | Age: 49
End: 2024-04-04
Payer: MEDICARE

## 2024-04-04 VITALS
SYSTOLIC BLOOD PRESSURE: 121 MMHG | OXYGEN SATURATION: 98 % | DIASTOLIC BLOOD PRESSURE: 80 MMHG | HEIGHT: 72 IN | HEART RATE: 87 BPM | BODY MASS INDEX: 29.12 KG/M2 | WEIGHT: 215 LBS

## 2024-04-04 DIAGNOSIS — H61.23 IMPACTED CERUMEN, BILATERAL: ICD-10-CM

## 2024-04-04 DIAGNOSIS — R07.0 PAIN IN THROAT: ICD-10-CM

## 2024-04-04 DIAGNOSIS — H93.13 TINNITUS, BILATERAL: ICD-10-CM

## 2024-04-04 PROCEDURE — 92557 COMPREHENSIVE HEARING TEST: CPT

## 2024-04-04 PROCEDURE — 69210 REMOVE IMPACTED EAR WAX UNI: CPT

## 2024-04-04 PROCEDURE — 99214 OFFICE O/P EST MOD 30 MIN: CPT | Mod: 25

## 2024-04-04 PROCEDURE — 92567 TYMPANOMETRY: CPT

## 2024-04-04 RX ORDER — FLUTICASONE PROPIONATE 50 UG/1
50 SPRAY, METERED NASAL DAILY
Qty: 1 | Refills: 3 | Status: ACTIVE | COMMUNITY
Start: 2024-04-04 | End: 1900-01-01

## 2024-04-04 NOTE — HISTORY OF PRESENT ILLNESS
[de-identified] : Patient with hx reflux and cerumen impaction presents for follow up.  had a pulmonary nodule on a free heart and body scan, but this resolved uses flonase daily uses debrox daily for the past 3 days for ear fullness  using lozenges/mucinex for sore throat lately (last 4 days), no fevers - feels it may be allergies.  Has been doing PT at a location where he wonders if he is being exposure to more germs.  Last evaluation by us in May 2022, would like an audio today. Had exposure to loud machinery at work.  denies otalgia, otorrhea, tinnitus, vertigo, hearing loss.   Did have a h/o tinnitus in the past.

## 2024-04-04 NOTE — PHYSICAL EXAM
[Normal] : mucosa is normal [Midline] : trachea located in midline position [de-identified] : left ear wax  [de-identified] : Nasal tip skin with small irregularity in mid tip

## 2024-04-04 NOTE — PROCEDURE
[FreeTextEntry3] : After informed verbal consent is obtained, cerumen is removed from the b/l ear canal with a curette & suction. Pt tolerated well, no residual ear canal irritation.

## 2024-04-04 NOTE — ASSESSMENT
[FreeTextEntry1] : cerumen impaction:  - removed today  -Audio within normal limits  tinnitus:  - audio today within normal limits  Sore throat:  - no evidence of infection -Fluticasone for nasal allergies, may be related to postnasal drip; Rx sent  Nasal tip scar - Recommend Dr. Riddle for CO2 laser  f/up prn

## 2024-04-26 ENCOUNTER — NON-APPOINTMENT (OUTPATIENT)
Age: 49
End: 2024-04-26

## 2024-05-01 ENCOUNTER — EMERGENCY (EMERGENCY)
Facility: HOSPITAL | Age: 49
LOS: 1 days | Discharge: ANOTHER HOSPITAL | End: 2024-05-01
Admitting: STUDENT IN AN ORGANIZED HEALTH CARE EDUCATION/TRAINING PROGRAM
Payer: COMMERCIAL

## 2024-05-01 VITALS
SYSTOLIC BLOOD PRESSURE: 138 MMHG | HEART RATE: 90 BPM | OXYGEN SATURATION: 100 % | DIASTOLIC BLOOD PRESSURE: 86 MMHG | TEMPERATURE: 98 F | RESPIRATION RATE: 16 BRPM

## 2024-05-01 LAB
ALBUMIN SERPL ELPH-MCNC: 4.2 G/DL — SIGNIFICANT CHANGE UP (ref 3.3–5)
ALP SERPL-CCNC: 66 U/L — SIGNIFICANT CHANGE UP (ref 40–120)
ALT FLD-CCNC: 17 U/L — SIGNIFICANT CHANGE UP (ref 4–41)
ANION GAP SERPL CALC-SCNC: 14 MMOL/L — SIGNIFICANT CHANGE UP (ref 7–14)
APAP SERPL-MCNC: <10 UG/ML — LOW (ref 15–25)
AST SERPL-CCNC: 26 U/L — SIGNIFICANT CHANGE UP (ref 4–40)
BASOPHILS # BLD AUTO: 0.04 K/UL — SIGNIFICANT CHANGE UP (ref 0–0.2)
BASOPHILS NFR BLD AUTO: 0.6 % — SIGNIFICANT CHANGE UP (ref 0–2)
BILIRUB SERPL-MCNC: 0.4 MG/DL — SIGNIFICANT CHANGE UP (ref 0.2–1.2)
BUN SERPL-MCNC: 11 MG/DL — SIGNIFICANT CHANGE UP (ref 7–23)
CALCIUM SERPL-MCNC: 8.6 MG/DL — SIGNIFICANT CHANGE UP (ref 8.4–10.5)
CHLORIDE SERPL-SCNC: 101 MMOL/L — SIGNIFICANT CHANGE UP (ref 98–107)
CO2 SERPL-SCNC: 21 MMOL/L — LOW (ref 22–31)
CREAT SERPL-MCNC: 0.97 MG/DL — SIGNIFICANT CHANGE UP (ref 0.5–1.3)
EGFR: 96 ML/MIN/1.73M2 — SIGNIFICANT CHANGE UP
EOSINOPHIL # BLD AUTO: 0.18 K/UL — SIGNIFICANT CHANGE UP (ref 0–0.5)
EOSINOPHIL NFR BLD AUTO: 2.8 % — SIGNIFICANT CHANGE UP (ref 0–6)
ETHANOL SERPL-MCNC: 49 MG/DL — HIGH
GLUCOSE SERPL-MCNC: 97 MG/DL — SIGNIFICANT CHANGE UP (ref 70–99)
HCT VFR BLD CALC: 41.1 % — SIGNIFICANT CHANGE UP (ref 39–50)
HGB BLD-MCNC: 13.9 G/DL — SIGNIFICANT CHANGE UP (ref 13–17)
IANC: 4.43 K/UL — SIGNIFICANT CHANGE UP (ref 1.8–7.4)
IMM GRANULOCYTES NFR BLD AUTO: 0.5 % — SIGNIFICANT CHANGE UP (ref 0–0.9)
LYMPHOCYTES # BLD AUTO: 1.26 K/UL — SIGNIFICANT CHANGE UP (ref 1–3.3)
LYMPHOCYTES # BLD AUTO: 19.9 % — SIGNIFICANT CHANGE UP (ref 13–44)
MCHC RBC-ENTMCNC: 30.4 PG — SIGNIFICANT CHANGE UP (ref 27–34)
MCHC RBC-ENTMCNC: 33.8 GM/DL — SIGNIFICANT CHANGE UP (ref 32–36)
MCV RBC AUTO: 89.9 FL — SIGNIFICANT CHANGE UP (ref 80–100)
MONOCYTES # BLD AUTO: 0.39 K/UL — SIGNIFICANT CHANGE UP (ref 0–0.9)
MONOCYTES NFR BLD AUTO: 6.2 % — SIGNIFICANT CHANGE UP (ref 2–14)
NEUTROPHILS # BLD AUTO: 4.43 K/UL — SIGNIFICANT CHANGE UP (ref 1.8–7.4)
NEUTROPHILS NFR BLD AUTO: 70 % — SIGNIFICANT CHANGE UP (ref 43–77)
NRBC # BLD: 0 /100 WBCS — SIGNIFICANT CHANGE UP (ref 0–0)
NRBC # FLD: 0 K/UL — SIGNIFICANT CHANGE UP (ref 0–0)
PLATELET # BLD AUTO: 278 K/UL — SIGNIFICANT CHANGE UP (ref 150–400)
POTASSIUM SERPL-MCNC: 3.4 MMOL/L — LOW (ref 3.5–5.3)
POTASSIUM SERPL-SCNC: 3.4 MMOL/L — LOW (ref 3.5–5.3)
PROT SERPL-MCNC: 6.3 G/DL — SIGNIFICANT CHANGE UP (ref 6–8.3)
RBC # BLD: 4.57 M/UL — SIGNIFICANT CHANGE UP (ref 4.2–5.8)
RBC # FLD: 12.2 % — SIGNIFICANT CHANGE UP (ref 10.3–14.5)
SALICYLATES SERPL-MCNC: <0.3 MG/DL — LOW (ref 15–30)
SODIUM SERPL-SCNC: 136 MMOL/L — SIGNIFICANT CHANGE UP (ref 135–145)
TOXICOLOGY SCREEN, DRUGS OF ABUSE, SERUM RESULT: SIGNIFICANT CHANGE UP
TSH SERPL-MCNC: 1.72 UIU/ML — SIGNIFICANT CHANGE UP (ref 0.27–4.2)
WBC # BLD: 6.33 K/UL — SIGNIFICANT CHANGE UP (ref 3.8–10.5)
WBC # FLD AUTO: 6.33 K/UL — SIGNIFICANT CHANGE UP (ref 3.8–10.5)

## 2024-05-01 PROCEDURE — 93010 ELECTROCARDIOGRAM REPORT: CPT

## 2024-05-01 PROCEDURE — 99285 EMERGENCY DEPT VISIT HI MDM: CPT

## 2024-05-01 RX ORDER — HALOPERIDOL DECANOATE 100 MG/ML
5 INJECTION INTRAMUSCULAR ONCE
Refills: 0 | Status: COMPLETED | OUTPATIENT
Start: 2024-05-01 | End: 2024-05-01

## 2024-05-01 RX ORDER — DIPHENHYDRAMINE HCL 50 MG
50 CAPSULE ORAL ONCE
Refills: 0 | Status: COMPLETED | OUTPATIENT
Start: 2024-05-01 | End: 2024-05-01

## 2024-05-01 RX ADMIN — HALOPERIDOL DECANOATE 5 MILLIGRAM(S): 100 INJECTION INTRAMUSCULAR at 21:45

## 2024-05-01 RX ADMIN — Medication 2 MILLIGRAM(S): at 21:45

## 2024-05-01 RX ADMIN — Medication 50 MILLIGRAM(S): at 21:45

## 2024-05-01 NOTE — ED BEHAVIORAL HEALTH NOTE - BEHAVIORAL HEALTH NOTE
As per request of provider, writer contacted pt’s father Juanpablo Wallace ([phone]/ cell [phone]) to obtain collateral information.  father passed the phone to pt’s sister Trina ([phone]) who provided the following information.     Pt is a 47 yo male domiciled w/ father, hx of schizophrenia, not working (on disability), not studying, single no children, bib ems.     Reason for ed visit: today pt texted therapist inappropriate things and suspected it was a psychotic episode. Pt was talking about porn and sex saying he knows she wants him.     Symptoms/hx: unusual thing past few weeks. She says pt is distracted driving , is forgetful, texting siblings about sex and inappropriate things. Pt has been to [employer] several times buying and returning things saying they don’t work. 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 She says pt is going to doctors excessive. No si or hi reported. Pt has a hx of si around 18 months ago. she says family is unaware of past suicide attempts. sister denied any AVH but says it seems like pt is in an alternate reality. Pt is struggling with sleep, pt has been become obsessed with being clean recently and appetite. she says pt became symptomatic around 6 weeks ago and has worsened. Pt is supported on disability since 2016 due to mental health problems.     Baseline: “hasn’t been well for a while even when on meds”. While on meds he presents subdued and fearful.    Stressors: father drinks.    Drug/alcohol use: pt drinks alcohol  unsure how often and how much.     Medical problems: thought he had “cushing disease” which is why he went off his medication last year.     Medication:  no medication since June 2023. Taking trazadone currently. pt was on risperidone in the past and other things.      Treatment team:  bony jane- therapist who can be reached at [phone].  Pt linked to [facility] psych and sees psych jeremie Gonzalez ([phone]). Last hospitalized in 2016 and has multiple psych admissions.     Family hx:  father has a hx of addiction.     Violence/aggression:  pt not violent or aggressive btu has a hx of aggression. no access to firearms or weapons.    Dispo: sister is advocating for psych admission.

## 2024-05-01 NOTE — ED BEHAVIORAL HEALTH NOTE - BEHAVIORAL HEALTH NOTE
Writer attempted to see patient, but he is not rousable for interview, pt remains asleep as side effect of meds. Treatment team will continue to assess.     Vitals and available labs reviewed; others remain pending.   EKG reviewed.    For agitation please attempt verbal de-escalation and behavioral intervention first. If patient does not respond to above, may give haldol 5 mg po q6h prn, ativan 2 mg po q6h prn if no contraindications. If patient is refusing PO, remains an imminent danger to self or others and If Patient's  qtc <500, can escalate to IM formulation. If IM antipsychotic is administered, please perform follow-up ECG for QTc monitoring.    ***    [facility] :     Search Terms: manolo agarwal, 1975Search Date: 05/01/2024 22:12:02 PM  Searching on behalf of: Myself  The Drug Utilization Report below displays all of the controlled substance prescriptions, if any, that your patient has filled in the last twelve months. The information displayed on this report is compiled from pharmacy submissions to the Department, and accurately reflects the information as submitted by the pharmacies.    This report was requested by: Jane Rees | Reference #: [id number]. There are no results for the search terms that you entered.    ***  [employer]     First Namemanolo Vaughn Namecarlospeace  [id number]  Recipient might not exist in the [employer] application, either because they are new to Medicaid or new to the Behavioral Health population.

## 2024-05-01 NOTE — ED ADULT NURSE NOTE - OBJECTIVE STATEMENT
Pt A&Ox4 ambulatory, PMH  psychosis, depression schizophrenia., presenting to the ED () c/o erratic behavior. Pt BIBEMS, states sister activated 911 due to patient erratic behavior. States patient has been non complaint to medication for approx 1 year. Pt making aggressive sexual remarks to staff, exhibiting defiant behavior, not following commands. Pt refusing to change clothing. Not answering assessment questions. Abdoulaye NP at bedside for evaluation. Security called, pt medicated as per orders, pt placed on restraints as per MD orders. Respirations are even and unlabored, NAD, no other complaints at this moment. Pending psych evaluation. Safety precautions implemented as per protocol, awaiting further MD orders, plan of care ongoing.

## 2024-05-01 NOTE — ED PROVIDER NOTE - NSFOLLOWUPINSTRUCTIONS_ED_ALL_ED_FT
Follow up with your PMD within 48-72 hrs. Show copies of your reports given to you.   Worsening, continued or new concerning symptoms return to the emergency department.    You have been given information necessary to follow up with the  [facility] ([facility]) Crisis center & other outpatient  psychiatric clinics within your community    • [facility] walk in Crisis centre  [address] [place] [address]  [phone] [email or web address]  Hours of operation:  Day	                                        Hours  Sunday                                  Closed  Monday                                9am - 3pm  Tuesday                                9am - 3pm  Wednesday                          9am - 3pm  Thursday                               9am - 3pm  Friday                                    9am - 3pm  Saturday                                Closed

## 2024-05-01 NOTE — ED ADULT TRIAGE NOTE - CHIEF COMPLAINT QUOTE
Pt coming in for psych eval. per ems pt was acting erratic  at home. family became worried and called 911. pt refusing to answer any questions and just shrugging. Pt has hx pf psychosis, depression schizophrenia. per ems family states hes hasn't been compliant with his medications for approx 6 months.

## 2024-05-01 NOTE — ED ADULT NURSE NOTE - NSFALLUNIVINTERV_ED_ALL_ED
Bed/Stretcher in lowest position, wheels locked, appropriate side rails in place/Call bell, personal items and telephone in reach/Instruct patient to call for assistance before getting out of bed/chair/stretcher/Non-slip footwear applied when patient is off stretcher/[place] to call system/Physically safe environment - no spills, clutter or unnecessary equipment/Purposeful proactive rounding/Room/bathroom lighting operational, light cord in reach

## 2024-05-01 NOTE — ED PROVIDER NOTE - PROGRESS NOTE DETAILS
patient complains of right ankle pain. on further questioning. patient states this is an old inury for which he is currently going to physical therapy for. no  new injury. ibuprofen written for. no further workup indicated. patient medically cleared for psych admission

## 2024-05-01 NOTE — ED PROVIDER NOTE - CLINICAL SUMMARY MEDICAL DECISION MAKING FREE TEXT BOX
47 yo M PMH MDD, Schizophrenia, Psychosis, HLD p/w increased agitation, not answering questions. As per EMS, patient was acting erratic at home and noncompliant with medications x6 months. Here in ED BH triage, patient refuse to answer questions and change into hospital gown resulting in being medicated for anxiety/agitation. Unable to determine if SI/HI/AH/VH. Unable to assess recent alcohol use or any other drugs.   Haldol, Ativan, Benadryl  Labs, EKG, COVID  SW collateral  Psych consult  Dispo as per consult

## 2024-05-02 VITALS
SYSTOLIC BLOOD PRESSURE: 111 MMHG | RESPIRATION RATE: 16 BRPM | DIASTOLIC BLOOD PRESSURE: 75 MMHG | HEART RATE: 106 BPM | OXYGEN SATURATION: 100 % | TEMPERATURE: 98 F

## 2024-05-02 DIAGNOSIS — F20.9 SCHIZOPHRENIA, UNSPECIFIED: ICD-10-CM

## 2024-05-02 LAB — SARS-COV-2 RNA SPEC QL NAA+PROBE: SIGNIFICANT CHANGE UP

## 2024-05-02 PROCEDURE — 99285 EMERGENCY DEPT VISIT HI MDM: CPT

## 2024-05-02 RX ORDER — IBUPROFEN 200 MG
400 TABLET ORAL ONCE
Refills: 0 | Status: COMPLETED | OUTPATIENT
Start: 2024-05-02 | End: 2024-05-02

## 2024-05-02 RX ADMIN — Medication 400 MILLIGRAM(S): at 06:15

## 2024-05-02 RX ADMIN — Medication 400 MILLIGRAM(S): at 05:40

## 2024-05-02 NOTE — ED ADULT NURSE REASSESSMENT NOTE - NS ED NURSE REASSESS COMMENT FT1
Pt received from previous RN Octavia resting comfortably in bed. Respirations even and unlabored. No signs of distress noted. Pt boarding, pending dispo.

## 2024-05-02 NOTE — ED BEHAVIORAL HEALTH ASSESSMENT NOTE - DETAILS
resting tremor on perphenazine boarding father : depression Haldol 5mg /ativan 2mg/[id number] no hx of suicide attempts. hx of si inform family

## 2024-05-02 NOTE — ED ADULT NURSE REASSESSMENT NOTE - NS ED NURSE REASSESS COMMENT FT1
Patient picked up by EMS for transport with personal belongings and without incident. Called sister Bhavna; [phone] to inform of transport.  DAMIÁN Wallis

## 2024-05-02 NOTE — ED BEHAVIORAL HEALTH ASSESSMENT NOTE - SUMMARY
pt is 47 yo Male, single, retired on pension, domiciled w/ father, with PPHx of Schizophrenia, Alcohol Use Disorder , r/o Illness  Anxiety Disorder, 3 hospitalizations , last one in  2015 for psychosis, hx of SI but no suicidal behavior, legal history regarding destruction of property while symptomatic resolved via diversion tx, medical hx of HLD, herniated disc, right ankle pain chronic due to tendinitis ,gets PT 2x/week; Patient attended services at [facility] from 1/7/13 - 5/2/23, currently in treatment at [facility] with Gyale GIBBS dneies taking any medications other than Trazadone prn for sleep; reports drinking etoh 1-2 x/week , hx of rehab in the past ; bib ems activated by the family for bizarre behavior in the past 6 weeks.  Patient upon arrival to ed was uncooperative , was making inappropriate sexual comments to the female nursing staff, required IM medications : Haldol 5mg /ativan 2mg/[id number]. bal upon arrival is 48.     Patient on assessment mostly evasive and guarded at times inconsistent with his answers and story, with t suspected delusions and impaired reasoning, likely representing  psychotic decompensation in the setting of non compliance with  medications and complicated by etoh use. As per collateral and pt , poor sleep in the past few days, here in ED pt woke up only 3 hrs later after given im medications and did not sleep the rest of the night , but remained in good control. In the setting of recent bizarre erratic  behavior , including sexual preoccupations ,insomnia ,  pt at this time is not at his baseline and warrants psychiatric admission  . 1 admit   2. no 1:1 required, not suicidal and not aggressive  3. defer standing meds to primary team   4. agitation: haldol 5mg /ativan 2mg po/im q6hrs prn  5. lipitor 20mg qhs qdaily  6. chronic tendinitis: as per ed team, no acute intervention. conitnue with PT pt is 49 yo Male, single, retired on pension, domiciled w/ father, with PPHx of Schizophrenia, Alcohol Use Disorder , r/o Illness  Anxiety Disorder, 3 hospitalizations , last one in  2015 for psychosis, hx of SI but no suicidal behavior, legal history regarding destruction of property while symptomatic resolved via diversion tx, medical hx of HLD, herniated disc, right ankle pain chronic due to tendinitis ,gets PT 2x/week; Patient attended services at [facility] from 1/7/13 - 5/2/23, currently in treatment at [facility] with Gayle GIBBS dneies taking any medications other than Trazadone prn for sleep; reports drinking etoh 1-2 x/week , hx of rehab in the past ; bib ems activated by the family for bizarre behavior in the past 6 weeks.  Patient upon arrival to ed was uncooperative , was making inappropriate sexual comments to the female nursing staff, required IM medications : Haldol 5mg /ativan 2mg/[id number]. bal upon arrival is 48.     Patient on assessment once he was awake , presents mostly evasive and guarded at times inconsistent with his answers and story, with  suspected delusions and impaired reasoning, likely representing  psychotic decompensation in the setting of non compliance with  medications as well as  complicated by etoh use. As per collateral and pt , poor sleep in the past few days, here in ED pt woke up only 3 hrs later after given im medications and did not sleep the rest of the night , but remained in good control. In the setting of recent bizarre erratic  behavior , including sexual preoccupations ,insomnia ,  pt at this time is not at his baseline and warrants psychiatric admission  . 1 admit under involuntary status 9.39  2. no 1:1 required, not suicidal and not aggressive  3. defer standing meds to primary team   4. agitation: haldol 5mg /ativan 2mg po/im q6hrs prn  5. lipitor 20mg qhs qdaily  6. chronic tendinitis: as per ed team, no acute intervention. conitnue with PT pt is 49 yo Male, single, retired on pension, domiciled w/ father, with PPHx of Schizophrenia, Alcohol Use Disorder , r/o Illness  Anxiety Disorder, 3 hospitalizations , last one in  2015 for psychosis, hx of SI but no suicidal behavior, legal history regarding destruction of property while symptomatic resolved via diversion tx, medical hx of HLD, herniated disc, right ankle pain chronic due to tendinitis ,gets PT 2x/week; Patient attended services at [facility] from 1/7/13 - 5/2/23, currently in treatment at [facility] with Gayle GIBBS dneies taking any medications other than Trazadone prn for sleep; reports drinking etoh 1-2 x/week , hx of rehab in the past ; bib ems activated by the family for bizarre behavior in the past 6 weeks.  Patient upon arrival to ed was uncooperative , was making inappropriate sexual comments to the female nursing staff, required IM medications : Haldol 5mg /ativan 2mg/[id number]. bal upon arrival is 48.     Patient on assessment once he was awake , presents mostly evasive and guarded at times inconsistent with his answers and story, with  suspected delusions and impaired reasoning, likely representing  psychotic decompensation in the setting of non compliance with  medications as well as  complicated by etoh use. As per collateral and pt , poor sleep in the past few days, here in ED pt woke up only 3 hrs later after given im medications and did not sleep the rest of the night , but remained in good control. In the setting of recent bizarre erratic  behavior , including sexual preoccupations ,insomnia ,  pt at this time is not at his baseline and warrants psychiatric admission  . 1 admit under involuntary status 9.27  2. no 1:1 required, not suicidal and not aggressive  3. defer standing meds to primary team   4. agitation: haldol 5mg /ativan 2mg po/im q6hrs prn  5. lipitor 20mg qhs qdaily  6. chronic tendinitis: as per ed team, no acute intervention. conitnue with PT

## 2024-05-02 NOTE — ED BEHAVIORAL HEALTH ASSESSMENT NOTE - HPI (INCLUDE ILLNESS QUALITY, SEVERITY, DURATION, TIMING, CONTEXT, MODIFYING FACTORS, ASSOCIATED SIGNS AND SYMPTOMS)
pt is 49 yo Male, single, retired on pension, domiciled w/ father, with PPHx of Schizophrenia, Alcohol Use Disorder , r/o Illness  Anxiety Disorder, 3 hospitalizations , last one in  2015 for psychosis, hx of SI but no suicidal behavior, legal history regarding destruction of property while symptomatic resolved via diversion tx, medical hx of HLD, herniated disc, right ankle pain chronic due to tendinitis ,gets PT 2x/week; Patient attended services at [facility] from 1/7/13 - 5/2/23, currently in treatment at [facility] with Gayle GIBBS dneies taking any medications other than Trazadone prn for sleep; reports drinking etoh 1-2 x/week , hx of rehab in the past ; bib ems activated by the family for bizarre behavior in the past 6 weeks.  Patient upon arrival to ed was uncooperative , was making inappropriate sexual comments to the female nursing staff, required IM medications : Haldol 5mg /ativan 2mg/[id number]    He was up few hours later , calm but guarded and evasive and provides inconsistent stories and answers . He states his sister called 911 because "I accidently sent a message to the wrong person, I did not have my glasses ". Patient then begins to state he has erectile dysfunction and his therapist has been advising him to watch porn and he was under the impression that she was coming on to him. He then questions "isn't inappropriate for the therapist to suggest to watch porn?", "I sent her inappropriate text". Patient  also states  "my hormones are high " and admits feeling hypersexual in recent days  , he is endorsing elevated energy level "I am exercising,  donating clothes, working on the yard", denies having racing thoughts .He describes his mood as "pretty good". He provides inconsistent answers regarding his sleep, initially says he did not get sleep any sleep in the past 2 nights , but then reports "I just woke up many time, but I still slept 7 hrs". He denies feeling depressed or anxious , denies si/hi/i/p. He denies any ah/vh, denies paranoia. He denies taking any psychiatric medications currently , states only Trazadone prn for sleep.  He reports he drank today 2 glasses of wine. pt is 47 yo Male, single, retired on pension, domiciled w/ father, with PPHx of Schizophrenia, Alcohol Use Disorder , r/o Illness  Anxiety Disorder, 3 hospitalizations , last one in  2015 for psychosis, hx of SI but no suicidal behavior, legal history regarding destruction of property while symptomatic resolved via diversion tx, medical hx of HLD, herniated disc, right ankle pain chronic due to tendinitis ,gets PT 2x/week; Patient attended services at [facility] from 1/7/13 - 5/2/23, currently in treatment at [facility] with Gayle GIBBS dneies taking any medications other than Trazadone prn for sleep; reports drinking etoh 1-2 x/week , hx of rehab in the past ; bib ems activated by the family for bizarre behavior in the past 6 weeks.  Patient upon arrival to ed was uncooperative , was making inappropriate sexual comments to the female nursing staff, required IM medications : Haldol 5mg /ativan 2mg/[id number]    He was up few hours later , calm but guarded and evasive and provides inconsistent stories and answers . He states his sister called 911 because "I accidently sent a message to the wrong person, I did not have my glasses ". Patient then begins to state he has erectile dysfunction and his therapist has been advising him to watch porn and he was under the impression that she was coming on to him. He then questions "isn't inappropriate for the therapist to suggest to watch porn?", "I sent her inappropriate text". Patient  also states  "my hormones are high " and admits feeling hypersexual in recent days  , he is endorsing elevated energy level "I am exercising,  donating clothes, working on the yard", denies having racing thoughts .He describes his mood as "pretty good". He provides inconsistent answers regarding his sleep, initially says he did not get sleep any sleep in the past 2 nights , but then reports "I just woke up many time, but I still slept 7 hrs". He denies feeling depressed or anxious , denies si/hi/i/p. He denies any ah/vh, denies paranoia. He denies taking any psychiatric medications currently , states only Trazadone prn for sleep.  He reports he drank today 2 glasses of wine.  see  note for collateral

## 2024-05-02 NOTE — ED BEHAVIORAL HEALTH ASSESSMENT NOTE - RISK ASSESSMENT
Risk factors include chronic mental illness, acute stressors, and recent changes in treatment. Protective factors include no current SI/HI/I/P, future orientation, no hx of suicide attempts, positive outpatient therapeutic relationships, no access to guns, supportive father, housing and employment stability, . At this time pt is at low imminent risk for self harm but given his recent   changes in behavior , and not currently on medications pt at this time is not at his baseline and warrants psychiatric hospitalization

## 2024-05-02 NOTE — ED ADULT NURSE REASSESSMENT NOTE - NS ED NURSE REASSESS COMMENT FT1
Pt appears to be resting comfortably, NAD, Respirations are even and unlabored, no complaints at this moment. Safety precautions implemented as per protocol, awaiting further MD orders, plan of care ongoing.

## 2024-05-02 NOTE — ED BEHAVIORAL HEALTH ASSESSMENT NOTE - DESCRIPTION
upon arrival uncooperative, inappropriate sexually, required im medications.    Vital Signs Last 24 Hrs  T(C): 36.8 (01 May 2024 22:20), Max: 36.8 (01 May 2024 21:27)  T(F): 98.2 (01 May 2024 22:20), Max: 98.3 (01 May 2024 21:27)  HR: 86 (01 May 2024 22:20) (86 - 90)  BP: 120/80 (01 May 2024 22:20) (120/80 - 138/86)  BP(mean): --  RR: 16 (01 May 2024 22:20) (16 - 16)  SpO2: 100% (01 May 2024 22:20) (100% - 100%)    Parameters below as of 01 May 2024 22:20  Patient On (Oxygen Delivery Method): room air HLD and herniated disc. NKDA see HPI

## 2024-05-02 NOTE — ED BEHAVIORAL HEALTH NOTE - BEHAVIORAL HEALTH NOTE
Spoke with DR. 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 He also reported he was harassed by the bank staff and had to call 911 on the staff because they were racist. At the time he was sending inappropriate texts to this siblings that had sexual content. Yesterday,  he apparently called therapist about 25x , when she did not answer his phone calls , he then  proceeded to texts her with 20 sexually inappropriate texts "I want you to give me a porn cites ", "what if we have granny sex". She reports he has not been on medications for about 1 year and she believes he has bene drinking eventhough he denies to her . She feels he is not at his baseline and at this time advocating strongly for psychiatric admission.

## 2024-05-02 NOTE — ED ADULT NURSE REASSESSMENT NOTE - NS ED NURSE REASSESS COMMENT FT1
Received report from Pm RN. Patient resting when I arrived. Easily aroused. VS stable. Patient is boarding and waiting for a bed assignment.   DAMIÁN Wallis

## 2024-05-03 ENCOUNTER — APPOINTMENT (OUTPATIENT)
Dept: OTOLARYNGOLOGY | Facility: CLINIC | Age: 49
End: 2024-05-03

## 2024-05-07 ENCOUNTER — APPOINTMENT (OUTPATIENT)
Dept: PULMONOLOGY | Facility: CLINIC | Age: 49
End: 2024-05-07

## 2024-06-11 ENCOUNTER — APPOINTMENT (OUTPATIENT)
Dept: OTOLARYNGOLOGY | Facility: CLINIC | Age: 49
End: 2024-06-11
Payer: MEDICARE

## 2024-06-11 VITALS — TEMPERATURE: 98 F | SYSTOLIC BLOOD PRESSURE: 124 MMHG | HEART RATE: 94 BPM | DIASTOLIC BLOOD PRESSURE: 78 MMHG

## 2024-06-11 DIAGNOSIS — R06.83 SNORING: ICD-10-CM

## 2024-06-11 DIAGNOSIS — J34.89 OTHER SPECIFIED DISORDERS OF NOSE AND NASAL SINUSES: ICD-10-CM

## 2024-06-11 DIAGNOSIS — R40.0 SOMNOLENCE: ICD-10-CM

## 2024-06-11 PROCEDURE — 99213 OFFICE O/P EST LOW 20 MIN: CPT | Mod: 25

## 2024-06-11 PROCEDURE — 31231 NASAL ENDOSCOPY DX: CPT

## 2024-06-12 NOTE — ASSESSMENT
[FreeTextEntry1] : Mr. VERGARA is a 48 year male s/p treatment of sinus infection in April doing well today, with occasional blood streaking after blowing his nose. Scope shows DNS L, no active bleeding  Epistaxis - we discussed his nose is likely drying due to the DNS, would start [place] gel to b/l nasal cavities daily  Snoring with daytime sleepiness - HST ordered will call with results

## 2024-06-12 NOTE — PHYSICAL EXAM
[Nasal Endoscopy Performed] : nasal endoscopy was performed, see procedure section for findings [] : septum deviated to the left [Normal] : no abnormal secretions [de-identified] : dry left

## 2024-06-12 NOTE — HISTORY OF PRESENT ILLNESS
[de-identified] : Patient with hx reflux and cerumen impaction presents for follow up.   [FreeTextEntry1] : pt here for f/u s/p root canal end of April tx for sinus infection with Clindamycin, he uses sinus rinse daily for last 10 years, avoids Flonase due to occasional blood on the tissue after doing daily sinus rinse. has never tried Azelastine. started Allegra D two days ago he feels he has sinus pressure and congestion in the morning. using Debrox when ears clog denies prior imaging notes that he does have snoring which is increasing.  Some daytime sleepiness.

## 2024-06-12 NOTE — REASON FOR VISIT
[Subsequent Evaluation] : a subsequent evaluation for [Nasal Obstruction] : nasal obstruction [FreeTextEntry2] : sinus pressure

## 2024-06-12 NOTE — PROCEDURE
[FreeTextEntry6] : reason for exam: anterior rhinoscopy insufficient for symptom evaluation   Fiberoptic nasal endoscopy was performed.  R/b/a of procedure was explained to the patient and they agreed to proceed with procedure.   normal mucosa, normal b/l inferior, middle and superior turbinates, inferior, middle and superior meati and sphenoethmoidal recess.  No nasal polyps.  Septum DEVIATED LEFT / SMALL AREA OF FRIABILITY/DRYNESS LEFT ANT SEPTUM  scope #: [id number]

## 2024-06-12 NOTE — END OF VISIT
[FreeTextEntry3] : I personally saw and examined KENN ARONALBERTO  in detail. I spoke to CHAGO Gonzalez regarding the assessment and plan of care. I performed the procedures and relevant physical exam. I have made changes to the body of the note wherever necessary and appropriate.

## 2024-06-13 ENCOUNTER — APPOINTMENT (OUTPATIENT)
Dept: ALLERGY | Facility: CLINIC | Age: 49
End: 2024-06-13

## 2024-07-08 ENCOUNTER — APPOINTMENT (OUTPATIENT)
Dept: PULMONOLOGY | Facility: CLINIC | Age: 49
End: 2024-07-08

## 2024-07-19 ENCOUNTER — APPOINTMENT (OUTPATIENT)
Dept: PULMONOLOGY | Facility: CLINIC | Age: 49
End: 2024-07-19
Payer: MEDICARE

## 2024-07-19 VITALS
WEIGHT: 253 LBS | SYSTOLIC BLOOD PRESSURE: 118 MMHG | DIASTOLIC BLOOD PRESSURE: 78 MMHG | OXYGEN SATURATION: 95 % | BODY MASS INDEX: 34.31 KG/M2 | HEART RATE: 84 BPM

## 2024-07-19 DIAGNOSIS — Z87.891 PERSONAL HISTORY OF NICOTINE DEPENDENCE: ICD-10-CM

## 2024-07-19 PROCEDURE — 94727 GAS DIL/WSHOT DETER LNG VOL: CPT

## 2024-07-19 PROCEDURE — G0296 VISIT TO DETERM LDCT ELIG: CPT

## 2024-07-19 PROCEDURE — 99214 OFFICE O/P EST MOD 30 MIN: CPT | Mod: 25

## 2024-07-19 PROCEDURE — 94010 BREATHING CAPACITY TEST: CPT

## 2024-07-19 PROCEDURE — 94729 DIFFUSING CAPACITY: CPT

## 2024-07-19 PROCEDURE — 95012 NITRIC OXIDE EXP GAS DETER: CPT

## 2024-07-23 ENCOUNTER — NON-APPOINTMENT (OUTPATIENT)
Age: 49
End: 2024-07-23

## 2024-07-23 DIAGNOSIS — R91.1 SOLITARY PULMONARY NODULE: ICD-10-CM

## 2024-12-26 NOTE — ED ADULT NURSE NOTE - NS_BH TRG Q5YES_ED_ALL_ED
Patient did not answer, left message saying I'll try calling back at another time.    Haritha Rois on 12/26/2024 at 1:58 PM    
No

## 2025-01-03 ENCOUNTER — NON-APPOINTMENT (OUTPATIENT)
Age: 50
End: 2025-01-03

## 2025-01-14 ENCOUNTER — APPOINTMENT (OUTPATIENT)
Dept: OTOLARYNGOLOGY | Facility: CLINIC | Age: 50
End: 2025-01-14

## 2025-01-16 ENCOUNTER — APPOINTMENT (OUTPATIENT)
Dept: PULMONOLOGY | Facility: CLINIC | Age: 50
End: 2025-01-16
Payer: MEDICARE

## 2025-01-16 VITALS
RESPIRATION RATE: 16 BRPM | SYSTOLIC BLOOD PRESSURE: 130 MMHG | OXYGEN SATURATION: 96 % | HEART RATE: 77 BPM | DIASTOLIC BLOOD PRESSURE: 81 MMHG

## 2025-01-16 DIAGNOSIS — Z87.891 PERSONAL HISTORY OF NICOTINE DEPENDENCE: ICD-10-CM

## 2025-01-16 DIAGNOSIS — J45.909 UNSPECIFIED ASTHMA, UNCOMPLICATED: ICD-10-CM

## 2025-01-16 DIAGNOSIS — R91.1 SOLITARY PULMONARY NODULE: ICD-10-CM

## 2025-01-16 PROCEDURE — 95012 NITRIC OXIDE EXP GAS DETER: CPT

## 2025-01-16 PROCEDURE — 99213 OFFICE O/P EST LOW 20 MIN: CPT | Mod: 25

## 2025-01-16 PROCEDURE — 94010 BREATHING CAPACITY TEST: CPT

## 2025-02-24 NOTE — ED PROVIDER NOTE - NS ED MD EM SELECTION
Caller: Jose A Villanueva    Relationship: Self    Best call back number: [phone]     Requested Prescriptions:   Requested Prescriptions     Pending Prescriptions Disp Refills    LORazepam (ATIVAN) 2 MG tablet 60 tablet 0     Sig: Take 1 tablet by mouth Every 6 (Six) Hours As Needed for Anxiety.    pregabalin (LYRICA) 150 MG capsule 60 capsule 2     Sig: Take 1 capsule by mouth Every 12 (Twelve) Hours.        Pharmacy where request should be sent: [facility] - [place] [id number] [facility] - [phone]  - [phone]      Last office visit with prescribing clinician: 12/2/2024   Last telemedicine visit with prescribing clinician: Visit date not found   Next office visit with prescribing clinician: 3/6/2025     Additional details provided by patient:     Does the patient have less than a 3 day supply:  [x] Yes  [] No    Would you like a call back once the refill request has been completed: [] Yes [x] No    If the office needs to give you a call back, can they leave a voicemail: [] Yes [x] No    Wendy Looney Rep   02/24/25 10:44 EST        [id number] [facility]

## 2025-07-07 ENCOUNTER — APPOINTMENT (OUTPATIENT)
Dept: PULMONOLOGY | Facility: CLINIC | Age: 50
End: 2025-07-07

## 2025-07-07 ENCOUNTER — APPOINTMENT (OUTPATIENT)
Dept: PULMONOLOGY | Facility: CLINIC | Age: 50
End: 2025-07-07
Payer: MEDICARE

## 2025-07-07 VITALS — OXYGEN SATURATION: 94 % | DIASTOLIC BLOOD PRESSURE: 81 MMHG | HEART RATE: 89 BPM | SYSTOLIC BLOOD PRESSURE: 131 MMHG

## 2025-07-07 LAB — POCT - HEMOGLOBIN (HGB), QUANTITATIVE, TRANSCUTANEOUS: 15.4

## 2025-07-07 PROCEDURE — 94727 GAS DIL/WSHOT DETER LNG VOL: CPT

## 2025-07-07 PROCEDURE — 94729 DIFFUSING CAPACITY: CPT

## 2025-07-07 PROCEDURE — 95012 NITRIC OXIDE EXP GAS DETER: CPT

## 2025-07-07 PROCEDURE — 88738 HGB QUANT TRANSCUTANEOUS: CPT

## 2025-07-07 PROCEDURE — 94060 EVALUATION OF WHEEZING: CPT

## 2025-07-07 PROCEDURE — 99214 OFFICE O/P EST MOD 30 MIN: CPT | Mod: 25

## 2025-07-07 PROCEDURE — G0296 VISIT TO DETERM LDCT ELIG: CPT
